# Patient Record
Sex: MALE | Race: ASIAN | NOT HISPANIC OR LATINO | URBAN - METROPOLITAN AREA
[De-identification: names, ages, dates, MRNs, and addresses within clinical notes are randomized per-mention and may not be internally consistent; named-entity substitution may affect disease eponyms.]

---

## 2025-08-01 ENCOUNTER — OFFICE VISIT (OUTPATIENT)
Dept: URGENT CARE | Age: 20
End: 2025-08-01
Payer: COMMERCIAL

## 2025-08-01 VITALS
OXYGEN SATURATION: 99 % | HEART RATE: 102 BPM | WEIGHT: 210 LBS | DIASTOLIC BLOOD PRESSURE: 77 MMHG | HEIGHT: 74 IN | SYSTOLIC BLOOD PRESSURE: 133 MMHG | RESPIRATION RATE: 20 BRPM | BODY MASS INDEX: 26.95 KG/M2 | TEMPERATURE: 102.4 F

## 2025-08-01 DIAGNOSIS — J02.0 STREP THROAT: ICD-10-CM

## 2025-08-01 LAB
POC HUMAN RHINOVIRUS PCR: NEGATIVE
POC INFLUENZA A VIRUS PCR: NEGATIVE
POC INFLUENZA B VIRUS PCR: NEGATIVE
POC RESPIRATORY SYNCYTIAL VIRUS PCR: NEGATIVE
POC STREPTOCOCCUS PYOGENES (GROUP A STREP) PCR: POSITIVE

## 2025-08-01 RX ORDER — CLINDAMYCIN HYDROCHLORIDE 300 MG/1
300 CAPSULE ORAL 3 TIMES DAILY
Qty: 30 CAPSULE | Refills: 0 | Status: SHIPPED | OUTPATIENT
Start: 2025-08-01 | End: 2025-08-11

## 2025-08-01 RX ORDER — METHYLPREDNISOLONE 4 MG/1
TABLET ORAL
Qty: 21 TABLET | Refills: 0 | Status: SHIPPED | OUTPATIENT
Start: 2025-08-01 | End: 2025-08-07

## 2025-08-01 NOTE — PROGRESS NOTES
"Subjective   Patient ID: Марина Pichardo is a 20 y.o. male. They present today with a chief complaint of Sore Throat and Mouth Lesions.    History of Present Illness  Patient is a 20-year-old male who presents with chief complaint of sore throat and canker sores for the past 3 days.  Notes intermittent fevers.  States he has been taking Motrin for his symptoms.  Denies any rashes, abdominal pain, headaches.    Past Medical History  Allergies as of 08/01/2025 - Reviewed 08/01/2025   Allergen Reaction Noted    Amoxicillin Unknown 08/01/2025       Prescriptions Prior to Admission[1]     Medical History[2]    Surgical History[3]         Review of Systems  ROS is negative unless otherwise stated in HPI.         Objective    Vitals:    08/01/25 1650   BP: 133/77   Pulse: 102   Resp: 20   Temp: (!) 39.1 °C (102.4 °F)   TempSrc: Oral   SpO2: 99%   Weight: 95.3 kg (210 lb)   Height: 1.88 m (6' 2\")     No LMP for male patient.      VS: As documented in the triage note and EMR flowsheet from this visit was reviewed  General: Well appearing. No acute distress.   Eyes:  Extraocular movements grossly intact. No scleral icterus.   Head: Atraumatic. Normocephalic.     Neck: No meningismus. No gross masses. Full movement through range of motion  ENT: Posterior oropharynx shows 3+ erythematous tonsils without exudate.  Uvula is midline without edema.  No stridor or trismus  CV: Regular rhythm. No murmurs, rubs, gallops appreciated.   Resp: Clear to auscultation bilaterally. No respiratory distress.    Neuro: CN II-VII intact. A&O x3. Speech fluent. Alert. Moving all extremities. Ambulates with normal gait  Psych: Appropriate mood and affect for situation      Point of Care Test & Imaging Results from this visit  Results for orders placed or performed in visit on 08/01/25   POCT SPOTFIRE R/ST Panel [facility] w[facility]Strep [facility] manually resulted   Result Value Ref Range    POC Group A Strep, PCR Positive (A) Negative    POC Respiratory " Syncytial Virus PCR Negative Negative    POC Influenza A Virus PCR Negative Negative    POC Influenza B Virus PCR Negative Negative    POC Human Rhinovirus PCR Negative Negative      Imaging  No results found.    Cardiology, Vascular, and Other Imaging  No other imaging results found for the past 2 days      Diagnostic study results (if any) were reviewed by Gisell Johnson PA-C.    Assessment/Plan   Allergies, medications, history, and pertinent labs/EKGs/Imaging reviewed by Gisell Johnson PA-C.     Medical Decision Making  Patient is a 20-year-old male who presents with sore throat and canker sores.  On examination, patient well-appearing.  Vitals are stable.  He does have a fever of 102.4.  Pharyngeal examination reveals enlarged 3+ symmetrical tonsils with erythema but no exudate.  Tolerating p.o.  Spot fire testing was positive for strep pharyngitis.  Noted severe allergy to amoxicillin and therefore will treat with clindamycin.  Patient states that he is having a lot of pain.  He was advised on over-the-counter Tylenol, Motrin and will be written for Medrol Dosepak to help with pain and swelling. Patient informed of the diagnosis.  They are agreeable to the plan as discussed above.  Patient given the opportunity to ask questions.  All of the patient's questions were answered. Given precautions in which to seek attention in the emergency department. Discussed follow up with PCP or other appropriate clinician.      Orders and Diagnoses  Diagnoses and all orders for this visit:  Strep throat  -     POCT SPOTFIRE R/ST Panel Mini w/Strep A ([facility]) manually resulted  -     clindamycin (Cleocin HCL) 300 mg capsule; Take 1 capsule (300 mg) by mouth 3 times a day for 10 days.  -     methylPREDNISolone (Medrol Dospak) 4 mg tablets; Follow schedule on package instructions      Medical Admin Record      Patient disposition: Home    Electronically signed by Gisell Johnson PA-C  5:34 PM           [1] (Not in a  hospital admission)   [2]   Past Medical History:  Diagnosis Date    Bitten or stung by nonvenomous insect and other nonvenomous arthropods, initial encounter 10/28/2015    Flea bite   [3] History reviewed. No pertinent surgical history.

## 2025-08-06 ENCOUNTER — OFFICE VISIT (OUTPATIENT)
Dept: URGENT CARE | Age: 20
End: 2025-08-06
Payer: COMMERCIAL

## 2025-08-06 ENCOUNTER — HOSPITAL ENCOUNTER (EMERGENCY)
Facility: HOSPITAL | Age: 20
Discharge: HOME | End: 2025-08-06
Attending: STUDENT IN AN ORGANIZED HEALTH CARE EDUCATION/TRAINING PROGRAM
Payer: COMMERCIAL

## 2025-08-06 ENCOUNTER — APPOINTMENT (OUTPATIENT)
Dept: RADIOLOGY | Facility: HOSPITAL | Age: 20
End: 2025-08-06
Payer: COMMERCIAL

## 2025-08-06 VITALS
OXYGEN SATURATION: 100 % | RESPIRATION RATE: 16 BRPM | HEART RATE: 77 BPM | HEIGHT: 74 IN | SYSTOLIC BLOOD PRESSURE: 120 MMHG | DIASTOLIC BLOOD PRESSURE: 76 MMHG | WEIGHT: 210 LBS | BODY MASS INDEX: 26.95 KG/M2 | TEMPERATURE: 97.9 F

## 2025-08-06 VITALS
RESPIRATION RATE: 16 BRPM | OXYGEN SATURATION: 100 % | HEIGHT: 74 IN | BODY MASS INDEX: 26.95 KG/M2 | HEART RATE: 77 BPM | DIASTOLIC BLOOD PRESSURE: 75 MMHG | TEMPERATURE: 98.2 F | SYSTOLIC BLOOD PRESSURE: 125 MMHG | WEIGHT: 210 LBS

## 2025-08-06 DIAGNOSIS — J36 PERITONSILLAR ABSCESS: Primary | ICD-10-CM

## 2025-08-06 DIAGNOSIS — K12.0 APHTHOUS ULCER: ICD-10-CM

## 2025-08-06 DIAGNOSIS — J02.9 ACUTE PHARYNGITIS, UNSPECIFIED ETIOLOGY: Primary | ICD-10-CM

## 2025-08-06 LAB
ANION GAP SERPL CALCULATED.3IONS-SCNC: 10 MMOL/L (ref 10–20)
BASOPHILS # BLD MANUAL: 0 X10*3/UL (ref 0–0.1)
BASOPHILS NFR BLD MANUAL: 0 %
BUN SERPL-MCNC: 11 MG/DL (ref 6–23)
BURR CELLS BLD QL SMEAR: NORMAL
CALCIUM SERPL-MCNC: 9.3 MG/DL (ref 8.6–10.3)
CHLORIDE SERPL-SCNC: 99 MMOL/L (ref 98–107)
CO2 SERPL-SCNC: 29 MMOL/L (ref 21–32)
CREAT SERPL-MCNC: 0.87 MG/DL (ref 0.5–1.3)
EGFRCR SERPLBLD CKD-EPI 2021: >90 ML/MIN/1.73M*2
EOSINOPHIL # BLD MANUAL: 0 X10*3/UL (ref 0–0.7)
EOSINOPHIL NFR BLD MANUAL: 0 %
ERYTHROCYTE [DISTWIDTH] IN BLOOD BY AUTOMATED COUNT: 12.6 % (ref 11.5–14.5)
GLUCOSE SERPL-MCNC: 87 MG/DL (ref 74–99)
HCT VFR BLD AUTO: 46.6 % (ref 41–52)
HGB BLD-MCNC: 15.6 G/DL (ref 13.5–17.5)
IMM GRANULOCYTES # BLD AUTO: 0.03 X10*3/UL (ref 0–0.7)
IMM GRANULOCYTES NFR BLD AUTO: 0.4 % (ref 0–0.9)
LYMPHOCYTES # BLD MANUAL: 2.77 X10*3/UL (ref 1.2–4.8)
LYMPHOCYTES NFR BLD MANUAL: 36 %
MCH RBC QN AUTO: 27.8 PG (ref 26–34)
MCHC RBC AUTO-ENTMCNC: 33.5 G/DL (ref 32–36)
MCV RBC AUTO: 83 FL (ref 80–100)
MONOCYTES # BLD MANUAL: 0.54 X10*3/UL (ref 0.1–1)
MONOCYTES NFR BLD MANUAL: 7 %
NEUTS SEG # BLD MANUAL: 4.16 X10*3/UL (ref 1.2–7)
NEUTS SEG NFR BLD MANUAL: 54 %
NRBC BLD-RTO: 0 /100 WBCS (ref 0–0)
PLATELET # BLD AUTO: 278 X10*3/UL (ref 150–450)
POTASSIUM SERPL-SCNC: 4.3 MMOL/L (ref 3.5–5.3)
RBC # BLD AUTO: 5.62 X10*6/UL (ref 4.5–5.9)
RBC MORPH BLD: NORMAL
SODIUM SERPL-SCNC: 134 MMOL/L (ref 136–145)
TOTAL CELLS COUNTED BLD: 100
VARIANT LYMPHS # BLD MANUAL: 0.23 X10*3/UL (ref 0–0.5)
VARIANT LYMPHS NFR BLD: 3 %
WBC # BLD AUTO: 7.7 X10*3/UL (ref 4.4–11.3)

## 2025-08-06 PROCEDURE — 80048 BASIC METABOLIC PNL TOTAL CA: CPT

## 2025-08-06 PROCEDURE — 1036F TOBACCO NON-USER: CPT

## 2025-08-06 PROCEDURE — 70491 CT SOFT TISSUE NECK W/DYE: CPT

## 2025-08-06 PROCEDURE — 96375 TX/PRO/DX INJ NEW DRUG ADDON: CPT | Mod: 59

## 2025-08-06 PROCEDURE — 85007 BL SMEAR W/DIFF WBC COUNT: CPT

## 2025-08-06 PROCEDURE — 99214 OFFICE O/P EST MOD 30 MIN: CPT

## 2025-08-06 PROCEDURE — 70491 CT SOFT TISSUE NECK W/DYE: CPT | Performed by: RADIOLOGY

## 2025-08-06 PROCEDURE — 2550000001 HC RX 255 CONTRASTS

## 2025-08-06 PROCEDURE — 36415 COLL VENOUS BLD VENIPUNCTURE: CPT

## 2025-08-06 PROCEDURE — 96365 THER/PROPH/DIAG IV INF INIT: CPT | Mod: 59

## 2025-08-06 PROCEDURE — 85027 COMPLETE CBC AUTOMATED: CPT

## 2025-08-06 PROCEDURE — 3008F BODY MASS INDEX DOCD: CPT

## 2025-08-06 PROCEDURE — 2500000004 HC RX 250 GENERAL PHARMACY W/ HCPCS (ALT 636 FOR OP/ED)

## 2025-08-06 PROCEDURE — 99285 EMERGENCY DEPT VISIT HI MDM: CPT | Mod: 25 | Performed by: STUDENT IN AN ORGANIZED HEALTH CARE EDUCATION/TRAINING PROGRAM

## 2025-08-06 RX ORDER — CLINDAMYCIN HYDROCHLORIDE 150 MG/1
450 CAPSULE ORAL 3 TIMES DAILY
Qty: 90 CAPSULE | Refills: 0 | Status: SHIPPED | OUTPATIENT
Start: 2025-08-06 | End: 2025-08-06

## 2025-08-06 RX ORDER — ONDANSETRON HYDROCHLORIDE 2 MG/ML
4 INJECTION, SOLUTION INTRAVENOUS ONCE
Status: DISCONTINUED | OUTPATIENT
Start: 2025-08-06 | End: 2025-08-06 | Stop reason: HOSPADM

## 2025-08-06 RX ORDER — DEXAMETHASONE SODIUM PHOSPHATE 10 MG/ML
10 INJECTION INTRAMUSCULAR; INTRAVENOUS ONCE
Status: COMPLETED | OUTPATIENT
Start: 2025-08-06 | End: 2025-08-06

## 2025-08-06 RX ORDER — CEFTRIAXONE 2 G/50ML
2 INJECTION, SOLUTION INTRAVENOUS ONCE
Status: COMPLETED | OUTPATIENT
Start: 2025-08-06 | End: 2025-08-06

## 2025-08-06 RX ORDER — CLINDAMYCIN HYDROCHLORIDE 150 MG/1
450 CAPSULE ORAL 3 TIMES DAILY
Qty: 90 CAPSULE | Refills: 0 | Status: SHIPPED | OUTPATIENT
Start: 2025-08-06 | End: 2025-08-16

## 2025-08-06 RX ADMIN — DEXAMETHASONE SODIUM PHOSPHATE 10 MG: 10 INJECTION INTRAMUSCULAR; INTRAVENOUS at 16:39

## 2025-08-06 RX ADMIN — IOHEXOL 75 ML: 350 INJECTION, SOLUTION INTRAVENOUS at 16:47

## 2025-08-06 RX ADMIN — CEFTRIAXONE 2 G: 2 INJECTION, SOLUTION INTRAVENOUS at 16:39

## 2025-08-06 ASSESSMENT — PAIN SCALES - GENERAL
PAINLEVEL_OUTOF10: 8

## 2025-08-06 ASSESSMENT — LIFESTYLE VARIABLES
HAVE YOU EVER FELT YOU SHOULD CUT DOWN ON YOUR DRINKING: NO
HAVE PEOPLE ANNOYED YOU BY CRITICIZING YOUR DRINKING: NO
EVER FELT BAD OR GUILTY ABOUT YOUR DRINKING: NO
EVER HAD A DRINK FIRST THING IN THE MORNING TO STEADY YOUR NERVES TO GET RID OF A HANGOVER: NO
TOTAL SCORE: 0

## 2025-08-06 ASSESSMENT — PAIN - FUNCTIONAL ASSESSMENT: PAIN_FUNCTIONAL_ASSESSMENT: 0-10

## 2025-08-06 NOTE — ED PROVIDER NOTES
HPI   Chief Complaint   Patient presents with    Sore Throat    Mouth Lesions       Patient is a 20-year-old male presenting with concerns for sore throat.  He was diagnosed with strep pharyngitis on 9/1.  Was started on 300 mg clindamycin 3 times a day.  Symptoms are not improving.  Also endorses he has been having multiple canker sores.  Denies significant fevers or chills.  Has been able to swallow his own secretions but having a hard time eating/drinking because of pain.  States he has some small lesions on his tongue as well that are causing discomfort.  No recent sexual activity.  Patient denies fevers, chills, cough, runny nose, chest pain, shortness of breath, abdominal pain, nausea, vomiting, diarrhea or urinary complaints.              Patient History   Medical History[1]  Surgical History[2]  Family History[3]  Social History[4]    Physical Exam   ED Triage Vitals [08/06/25 1601]   Temperature Heart Rate Respirations BP   36.8 °C (98.2 °F) 75 16 126/79      Pulse Ox Temp Source Heart Rate Source Patient Position   100 % Temporal Monitor Sitting      BP Location FiO2 (%)     Left arm --       Physical Exam  Vitals and nursing note reviewed.   Constitutional:       Appearance: He is well-developed.      Comments: Awake, sitting in examination chair   HENT:      Head: Normocephalic and atraumatic.      Nose: Nose normal.      Mouth/Throat:      Mouth: Mucous membranes are moist.      Pharynx: Oropharynx is clear. Posterior oropharyngeal erythema present.      Tonsils: 2+ on the right. 2+ on the left.      Comments: Multiple aphthous ulcers present in the posterior oropharynx.    Eyes:      Extraocular Movements: Extraocular movements intact.      Conjunctiva/sclera: Conjunctivae normal.      Pupils: Pupils are equal, round, and reactive to light.       Cardiovascular:      Rate and Rhythm: Normal rate and regular rhythm.      Pulses: Normal pulses.      Heart sounds: Normal heart sounds. No murmur  heard.  Pulmonary:      Effort: Pulmonary effort is normal. No respiratory distress.      Breath sounds: Normal breath sounds.   Abdominal:      General: Abdomen is flat.      Palpations: Abdomen is soft.      Tenderness: There is no abdominal tenderness.     Musculoskeletal:         General: No swelling. Normal range of motion.      Cervical back: Normal range of motion and neck supple.     Skin:     General: Skin is warm and dry.      Capillary Refill: Capillary refill takes less than 2 seconds.     Neurological:      General: No focal deficit present.      Mental Status: He is alert and oriented to person, place, and time.     Psychiatric:         Mood and Affect: Mood normal.         Behavior: Behavior normal.           ED Course & MDM   Diagnoses as of 08/07/25 0905   Acute pharyngitis, unspecified etiology   Aphthous ulcer                 No data recorded     [place] Coma Scale Score: 15 (08/06/25 1603 : Magy Vee RN)                           Medical Decision Making  Patient is a 20-year-old male present with concerns for sore throat.  Labwork and imaging ordered.  Conditions considered include but are not limited: Strep pharyngitis, PTA, subtherapeutic antibiotics.    I saw this patient in conjunction with Dr. Emmanuel Du.  CBC is without leukocytosis or anemia.  BMP without significant electrolyte abnormality renal impairment.  CAT scan does not show definitive abscess.    Patient's medication does appear to be subtherapeutic.  Patient will be increased to 450 mg of clindamycin 3 times a day.  Patient was given IV Rocephin while in the emergency department after discussion with ED pharmacy.  I believe this patient is at low risk for complication, and a disposition of discharge is acceptable.  Return to the Emergency Department if new or worsening symptoms including headache, fever, chills, chest pain, shortness of breath, syncope, near syncope, abdominal pain, nausea, vomiting,  diarrhea, or  worsening pain.  Prescription for antibiotics written.  Patient is agreeable to disposition of discharge and to follow with respective fields in the next several days.    Portions of this note made with Dragon software, please be mindful of potential grammatical errors.      Medications   cefTRIAXone (Rocephin) 2 g in dextrose (iso) IV 50 mL (0 g intravenous Stopped 8/6/25 1709)   dexAMETHasone (Decadron) injection 10 mg (10 mg intravenous Given 8/6/25 1639)   iohexol (OMNIPaque) 350 mg iodine/mL solution 75 mL (75 mL intravenous Given 8/6/25 1647)     Labs Reviewed   BASIC METABOLIC PANEL - Abnormal       Result Value    Glucose 87      Sodium 134 (*)     Potassium 4.3      Chloride 99      Bicarbonate 29      Anion Gap 10      Urea Nitrogen 11      Creatinine 0.87      eGFR >90      Calcium 9.3     CBC WITH AUTO DIFFERENTIAL - Normal    WBC 7.7      nRBC 0.0      RBC 5.62      Hemoglobin 15.6      Hematocrit 46.6      MCV 83      MCH 27.8      MCHC 33.5      RDW 12.6      Platelets 278      Immature Granulocytes %, Automated 0.4      Immature Granulocytes Absolute, Automated 0.03      Narrative:     The previously reported component Neutrophils % is no longer being reported.  The previously reported component Lymphocytes % is no longer being reported.  The previously reported component Monocytes % is no longer being reported.  The previously   reported component Eosinophils % is no longer being reported.  The previously reported component Basophils % is no longer being reported.  The previously reported component Absolute Neutrophils is no longer being reported.  The previously reported   component Absolute Lymphocytes is no longer being reported.  The previously reported component Absolute Monocytes is no longer being reported.  The previously reported component Absolute Eosinophils is no longer being reported.  The previously reported   component Absolute Basophils is no longer being reported.   MANUAL  DIFFERENTIAL    Neutrophils %, Manual 54.0      Lymphocytes %, Manual 36.0      Monocytes %, Manual 7.0      Eosinophils %, Manual 0.0      Basophils %, Manual 0.0      Atypical Lymphocytes %, Manual 3.0      Seg Neutrophils Absolute, Manual 4.16      Lymphocytes Absolute, Manual 2.77      Monocytes Absolute, Manual 0.54      Eosinophils Absolute, Manual 0.00      Basophils Absolute, Manual 0.00      Atypical Lymphs Absolute, Manual 0.23      Total Cells Counted 100      RBC Morphology See Below      [place] Cells Few       CT soft tissue neck w IV contrast   Final Result   Induration of the pharyngeal mucosa and palatine tonsils. No abscess   identified. No significant effacement of the aerodigestive tract.        Prominent bilateral upper cervical chain lymph nodes presumed   reactive.        MACRO:   None        Signed by: Joseph Spain 8/6/2025 5:08 PM   Dictation workstation:   [id number]            Procedure  Procedures       [1]   Past Medical History:  Diagnosis Date    Bitten or stung by nonvenomous insect and other nonvenomous arthropods, initial encounter 10/28/2015    Flea bite   [2] No past surgical history on file.  [3] No family history on file.  [4]   Social History  Tobacco Use    Smoking status: Never    Smokeless tobacco: Never   Vaping Use    Vaping status: Never Used   Substance Use Topics    Alcohol use: Not on file    Drug use: Not on file        Hi Grayson PA-C  08/07/25 0905

## 2025-08-06 NOTE — Clinical Note
Марина Pichardo was seen and treated in our emergency department on 8/6/2025.  He may return to work on 08/08/2025.       If you have any questions or concerns, please don't hesitate to call.      Hi Grayson PA-C

## 2025-08-06 NOTE — DISCHARGE INSTRUCTIONS
Please take 450 mg of clindamycin 3 times per day.  Utilize Magic mouthwash for your canker sores.    Follow with your primary care provider.    Be sure to take all medications, over the counter medications or prescription medications only as directed.    Be sure to follow up as directed in 1-2 days. All of the details of your follow up instructions are detailed in the follow up section of this packet.    If you are being discharged with any pains medications or muscle relaxers (norco, Vicodin, hydrocodone products, Percocet, oxycodone products, flexeril, cyclobenzaprine, robaxin, norflex, brand or generic, or any other pain controlling medications with the exception of Ibuprofen and regular Tylenol, do not drive or operate machinery, climb ladders or participate in any activity that could potentially put yourself or others at risk should you get dizzy, or be/feel impaired at all.    Return to emergency room without delay for ANY new or worsening pains or for any other symptoms or concerns. Return with worsening pains, nausea, vomiting, trouble breathing, palpitations, shortness of breath, inability to pass stool or urine, loss of control of stool or urine, any numbness or tingling (that is not normal for you), uncontrolled fevers, the passing of blood or other material in stool or urine, rashes, pains or for any other symptoms or concerns you may have. You are always welcome to return to the ER at any time for any reason or for any other concerns you may have.

## 2025-08-06 NOTE — ED TRIAGE NOTES
Tested positive for strep last Thursday. Since then has had cuts on his tongue . Can't eat or drink or talk much and has general pain in his mouth. Has in abscess in his mouth as well

## 2025-08-06 NOTE — PROGRESS NOTES
"Subjective   Patient ID: Марина Pichardo is a 20 y.o. male. They present today with a chief complaint of Sore Throat (Was here 8/1/25 mouth is still sore with sores and cuts and gums swollen.).    History of Present Illness  HPI  Presents with complaints of worsening sore throat, swollen gums.  He was evaluated at this urgent care 5 days ago and was treated for strep pharyngitis with clindamycin due to severe penicillin allergy.  Patient states he has been taking his antibiotics as instructed and he is getting worse.  Patient denies fevers, chills, chest pain, shortness of breath, difficulty breathing.  He states that he can even drink or eat food.  He is accompanied by his father today.  Past Medical History  Allergies as of 08/06/2025 - Reviewed 08/06/2025   Allergen Reaction Noted    Amoxicillin Unknown 08/01/2025       Prescriptions Prior to Admission[1]     Medical History[2]    Surgical History[3]     reports that he has never smoked. He has never used smokeless tobacco.    Review of Systems  Review of Systems  ROS is negative unless otherwise stated in HPI.                             Objective    Vitals:    08/06/25 1510   BP: 125/75   BP Location: Right arm   Patient Position: Sitting   Pulse: 77   Resp: 16   Temp: 36.8 °C (98.2 °F)   TempSrc: Oral   SpO2: 100%   Weight: 95.3 kg (210 lb)   Height: 1.88 m (6' 2\")     No LMP for male patient.    Physical Exam  VS: As documented in the triage note and EMR flowsheet from this visit was reviewed  General: Well appearing. No acute distress.   Eyes:  Extraocular movements grossly intact. No scleral icterus.   Head: Atraumatic. Normocephalic.     Neck: No meningismus. No gross masses. Full movement through range of motion  ENT: Posterior oropharynx shows n significant erythema with exudate and edema.  Uvula is deviated slightly to the right without edema.  There is an obvious left peritonsillar abscess.  No stridor or trismus. No cervical lymphadenopathy. No sinus " tenderness. No mastoid tenderness.  Bilateral tympanic membranes are intact without bulging or erythema.  Bilateral external auditory canals are clear without exudate or edema.  Nasal turbinates are without inflammation with clear nasal discharge present.  No signs of obstruction.  CV: Regular rhythm. No murmurs, rubs, gallops appreciated.   Resp: Clear to auscultation bilaterally. No respiratory distress.    MSK: Symmetric muscle bulk. No gross step offs or deformities.  Skin: Warm, dry. No rashes  Neuro: CN II-VII intact. A&O x3. Speech fluent. Alert. Moving all extremities. Ambulates with normal gait  Psych: Appropriate mood and affect for situation  Procedures    Point of Care Test & Imaging Results from this visit  No results found for this visit on 08/06/25.   Imaging  No results found.    Cardiology, Vascular, and Other Imaging  No other imaging results found for the past 2 days      Diagnostic study results (if any) were reviewed by Madeline Lujan PA-C.    Assessment/Plan   Allergies, medications, history, and pertinent labs/EKGs/Imaging reviewed by Madeline Lujan PA-C.     Medical Decision Making  The patient was evaluated for above complaints in [facility]. The patient has strep pharyngitis complicated by left peritonsillar abscess. Patient educated on treatment plan consisting of transfer to the emergency department.  The patient is stable, talking in full sentences, breathing without issue.  The patient is accompanied by his father who will be taking him to the emergency room at [facility].      Orders and Diagnoses  Diagnoses and all orders for this visit:  Peritonsillar abscess      Medical Admin Record      Patient disposition: ED. Transported by: Private Vehicle.    Electronically signed by Madeline Lujan PA-C  3:34 PM           [1] (Not in a hospital admission)   [2]   Past Medical History:  Diagnosis Date    Bitten or stung by nonvenomous insect and other nonvenomous arthropods, initial encounter  10/28/2015    Flea bite   [3] No past surgical history on file.